# Patient Record
Sex: FEMALE | Race: BLACK OR AFRICAN AMERICAN | NOT HISPANIC OR LATINO | URBAN - METROPOLITAN AREA
[De-identification: names, ages, dates, MRNs, and addresses within clinical notes are randomized per-mention and may not be internally consistent; named-entity substitution may affect disease eponyms.]

---

## 2020-06-05 ENCOUNTER — OFFICE VISIT (OUTPATIENT)
Dept: URBAN - METROPOLITAN AREA CLINIC 113 | Facility: CLINIC | Age: 70
End: 2020-06-05

## 2020-07-25 ENCOUNTER — TELEPHONE ENCOUNTER (OUTPATIENT)
Dept: URBAN - METROPOLITAN AREA CLINIC 13 | Facility: CLINIC | Age: 70
End: 2020-07-25

## 2020-07-25 RX ORDER — AMLODIPINE BESYLATE 5 MG/1
TAKE 1 TABLET DAILY TABLET ORAL
Qty: 90 | Refills: 0 | OUTPATIENT
Start: 2012-10-25 | End: 2018-03-20

## 2020-07-25 RX ORDER — ESOMEPRAZOLE MAGNESIUM 40 MG
TAKE 1 TABLET DAILY CAPSULE,DELAYED RELEASE (ENTERIC COATED) ORAL
Qty: 90 | Refills: 3 | OUTPATIENT
Start: 2013-06-26 | End: 2018-03-20

## 2020-07-25 RX ORDER — LOSARTAN POTASSIUM 100 MG/1
TAKE 1 TABLET DAILY TABLET, FILM COATED ORAL
Refills: 0 | OUTPATIENT
End: 2019-05-10

## 2020-07-25 RX ORDER — POLYETHYLENE GLYCOL 3350, SODIUM CHLORIDE, SODIUM BICARBONATE AND POTASSIUM CHLORIDE WITH LEMON FLAVOR 420; 11.2; 5.72; 1.48 G/4L; G/4L; G/4L; G/4L
TAKE 1/2 GALLON AT 5:00 PM DAY BEFORE PROCEDURE, TAKE SECOND 1/2 OF GALLON 6 HRS PRIOR TO PROCEDURE POWDER, FOR SOLUTION ORAL
Qty: 1 | Refills: 0 | OUTPATIENT
Start: 2018-08-27 | End: 2018-10-09

## 2020-07-25 RX ORDER — HYDROCODONE BITARTRATE AND ACETAMINOPHEN 5; 325 MG/1; MG/1
TABLET ORAL
Refills: 0 | OUTPATIENT
End: 2018-10-09

## 2020-07-25 RX ORDER — PANTOPRAZOLE SODIUM 40 MG/1
TAKE 1 TABLET BY MOUTH EVERY DAY TABLET, DELAYED RELEASE ORAL
Qty: 30 | Refills: 5 | OUTPATIENT
Start: 2019-06-03 | End: 2019-06-25

## 2020-07-25 RX ORDER — CARVEDILOL PHOSPHATE 20 MG/1
TAKE 1 CAPSULE EVERY MORNING WITH FOOD CAPSULE, EXTENDED RELEASE ORAL
Refills: 0 | OUTPATIENT
Start: 2012-10-25 | End: 2018-10-09

## 2020-07-25 RX ORDER — OMEPRAZOLE 20 MG/1
TAKE 1 CAPSULE DAILY CAPSULE, DELAYED RELEASE ORAL
Refills: 0 | OUTPATIENT
End: 2012-12-19

## 2020-07-25 RX ORDER — SUCRALFATE 1 G/1
DISSOLE ONE TABLET IN 2 TO 3 TABLESPOONS OF WATERS AND DRINK EVERY 6 HOURS AS NEEDED TABLET ORAL
Qty: 90 | Refills: 0 | OUTPATIENT
Start: 2013-01-23 | End: 2013-07-30

## 2020-07-25 RX ORDER — OMEPRAZOLE 40 MG/1
TAKE 1 CAPSULE BY MOUTH 30 MINUTES BEFORE BREAKFAST CAPSULE, DELAYED RELEASE ORAL
Qty: 30 | Refills: 3 | OUTPATIENT
Start: 2018-03-20 | End: 2018-10-09

## 2020-07-25 RX ORDER — ASPIRIN 81 MG/1
TAKE TABLET  PRN TABLET, DELAYED RELEASE ORAL
Refills: 0 | OUTPATIENT
End: 2012-11-26

## 2020-07-25 RX ORDER — ESOMEPRAZOLE MAGNESIUM 40 MG
TAKE ONE CAPSULE EVERY DAY CAPSULE,DELAYED RELEASE (ENTERIC COATED) ORAL
Qty: 30 | Refills: 3 | OUTPATIENT
End: 2013-07-30

## 2020-07-25 RX ORDER — TRAMADOL HYDROCHLORIDE 50 MG/1
TAKE 1 TABLET BY MOUTH EVERY 6 HOURS AS NEEDED FOR PAIN TABLET ORAL
Qty: 45 | Refills: 0 | OUTPATIENT
End: 2019-06-03

## 2020-07-26 ENCOUNTER — TELEPHONE ENCOUNTER (OUTPATIENT)
Dept: URBAN - METROPOLITAN AREA CLINIC 13 | Facility: CLINIC | Age: 70
End: 2020-07-26

## 2020-07-26 RX ORDER — DOXYCYCLINE 100 MG/1
TK 1 T PO BID TABLET, FILM COATED ORAL
Qty: 20 | Refills: 0 | Status: ACTIVE | COMMUNITY
Start: 2018-04-25

## 2020-07-26 RX ORDER — LOSARTAN POTASSIUM AND HYDROCHLOROTHIAZIDE 100; 25 MG/1; MG/1
TK 1 T PO QD TABLET, FILM COATED ORAL
Qty: 90 | Refills: 0 | Status: ACTIVE | COMMUNITY
Start: 2017-10-30

## 2020-07-26 RX ORDER — HYDROCODONE BITARTRATE AND ACETAMINOPHEN 7.5; 325 MG/1; MG/1
TABLET ORAL
Qty: 24 | Refills: 0 | Status: ACTIVE | COMMUNITY
Start: 2019-03-13

## 2020-07-26 RX ORDER — CHLORPHENIRAMINE MALEATE, DEXTROMETHORPHAN HYDROBROMIDE AND PHENYLEPHRINE HYDROCHLORIDE 4; 10; 15 MG/5ML; MG/5ML; MG/5ML
TAKE 5 ML EVERY 6 HOURS AS NEEDED ORALLY LIQUID ORAL
Qty: 180 | Refills: 0 | Status: ACTIVE | COMMUNITY
Start: 2019-07-03

## 2020-07-26 RX ORDER — PRAVASTATIN SODIUM 10 MG/1
TAKE 1 TABLET AT BEDTIME TABLET ORAL
Refills: 0 | Status: ACTIVE | COMMUNITY

## 2020-07-26 RX ORDER — TIZANIDINE 4 MG/1
TK 1 T PO  BID PRN TABLET ORAL
Qty: 60 | Refills: 0 | Status: ACTIVE | COMMUNITY
Start: 2018-07-06

## 2020-07-26 RX ORDER — DOXAZOSIN 2 MG/1
TABLET ORAL
Qty: 90 | Refills: 0 | Status: ACTIVE | COMMUNITY
Start: 2019-08-05

## 2020-07-26 RX ORDER — HYDROCODONE BITARTRATE AND ACETAMINOPHEN 7.5; 325 MG/1; MG/1
TK 1 T PO Q 12 H PRN TABLET ORAL
Qty: 15 | Refills: 0 | Status: ACTIVE | COMMUNITY
Start: 2018-07-09

## 2020-07-26 RX ORDER — LEVOFLOXACIN 500 MG/1
TK 1 T PO ONCE DAILY TABLET, FILM COATED ORAL
Qty: 10 | Refills: 0 | Status: ACTIVE | COMMUNITY
Start: 2018-07-06

## 2020-07-26 RX ORDER — BENZONATATE 100 MG/1
TK 1 C PO TID FOR 10 DAYS CAPSULE ORAL
Qty: 30 | Refills: 0 | Status: ACTIVE | COMMUNITY
Start: 2018-02-27

## 2020-07-26 RX ORDER — MELOXICAM 15 MG/1
TABLET ORAL
Qty: 90 | Refills: 0 | Status: ACTIVE | COMMUNITY
Start: 2012-08-20

## 2020-07-26 RX ORDER — DOXYCYCLINE 100 MG/1
TK ONE C PO BID FOR 10 DAYS CAPSULE ORAL
Qty: 20 | Refills: 0 | Status: ACTIVE | COMMUNITY
Start: 2018-07-09

## 2020-07-26 RX ORDER — PANTOPRAZOLE 20 MG/1
TAKE 1 TABLET DAILY TABLET, DELAYED RELEASE ORAL
Qty: 90 | Refills: 3 | Status: ACTIVE | COMMUNITY
Start: 2019-06-25

## 2020-07-26 RX ORDER — HYDROCODONE BITARTRATE AND ACETAMINOPHEN 7.5; 325 MG/1; MG/1
TABLET ORAL
Qty: 30 | Refills: 0 | Status: ACTIVE | COMMUNITY
Start: 2019-02-13

## 2020-07-26 RX ORDER — HYDROCHLOROTHIAZIDE 25 MG/1
TABLET ORAL
Qty: 90 | Refills: 0 | Status: ACTIVE | COMMUNITY
Start: 2019-08-04

## 2020-07-26 RX ORDER — CLINDAMYCIN HYDROCHLORIDE 300 MG/1
CAPSULE ORAL
Qty: 30 | Refills: 0 | Status: ACTIVE | COMMUNITY
Start: 2019-03-13

## 2020-07-26 RX ORDER — LOSARTAN POTASSIUM AND HYDROCHLOROTHIAZIDE 100; 25 MG/1; MG/1
TAKE 1 TABLET BY MOUTH EVERY DAY TABLET, FILM COATED ORAL
Qty: 90 | Refills: 0 | Status: ACTIVE | COMMUNITY
Start: 2018-07-08

## 2020-07-26 RX ORDER — SITAGLIPTIN AND METFORMIN HYDROCHLORIDE 100; 1000 MG/1; MG/1
TABLET, FILM COATED, EXTENDED RELEASE ORAL
Qty: 90 | Refills: 0 | Status: ACTIVE | COMMUNITY
Start: 2019-02-20

## 2020-07-26 RX ORDER — DOXAZOSIN MESYLATE 4 MG/1
TABLET ORAL
Qty: 90 | Refills: 0 | Status: ACTIVE | COMMUNITY
Start: 2020-03-12

## 2020-07-26 RX ORDER — TIZANIDINE 4 MG/1
TABLET ORAL
Qty: 60 | Refills: 0 | Status: ACTIVE | COMMUNITY
Start: 2019-02-20

## 2020-07-26 RX ORDER — PRAVASTATIN SODIUM 40 MG/1
TABLET ORAL
Qty: 90 | Refills: 0 | Status: ACTIVE | COMMUNITY
Start: 2019-02-20

## 2020-07-26 RX ORDER — BLOOD SUGAR DIAGNOSTIC
STRIP MISCELLANEOUS
Qty: 200 | Refills: 0 | Status: ACTIVE | COMMUNITY
Start: 2019-10-24

## 2020-07-26 RX ORDER — NEOMYCIN SULFATE, POLYMYXIN B SULFATE AND HYDROCORTISONE 3.5; 10000; 1 MG/ML; [IU]/ML; MG/ML
SOLUTION AURICULAR (OTIC)
Qty: 10 | Refills: 0 | Status: ACTIVE | COMMUNITY
Start: 2019-02-13

## 2020-07-26 RX ORDER — FLUCONAZOLE 150 MG/1
TABLET ORAL
Qty: 1 | Refills: 0 | Status: ACTIVE | COMMUNITY
Start: 2019-08-15

## 2020-07-26 RX ORDER — PIOGLITAZONE 30 MG/1
TABLET ORAL
Qty: 90 | Refills: 0 | Status: ACTIVE | COMMUNITY
Start: 2020-04-24

## 2020-07-26 RX ORDER — OXYCODONE AND ACETAMINOPHEN 10; 325 MG/1; MG/1
TK 1 T PO Q 8 H PRN TABLET ORAL
Qty: 21 | Refills: 0 | Status: ACTIVE | COMMUNITY
Start: 2018-12-31

## 2020-07-26 RX ORDER — LOSARTAN POTASSIUM AND HYDROCHLOROTHIAZIDE 100; 25 MG/1; MG/1
TAKE 1 TABLET DAILY TABLET, FILM COATED ORAL
Refills: 0 | Status: ACTIVE | COMMUNITY

## 2020-07-26 RX ORDER — OXYCODONE AND ACETAMINOPHEN 5; 325 MG/1; MG/1
TK 1 T PO  Q 4 H PRN P TABLET ORAL
Qty: 40 | Refills: 0 | Status: ACTIVE | COMMUNITY
Start: 2018-12-17

## 2020-07-26 RX ORDER — OFLOXACIN 3 MG/ML
SOLUTION AURICULAR (OTIC)
Qty: 10 | Refills: 0 | Status: ACTIVE | COMMUNITY
Start: 2020-04-13

## 2020-07-26 RX ORDER — TIZANIDINE HYDROCHLORIDE 4 MG/1
CAPSULE ORAL
Qty: 60 | Refills: 0 | Status: ACTIVE | COMMUNITY
Start: 2019-04-27

## 2020-07-26 RX ORDER — GLIMEPIRIDE 4 MG/1
TABLET ORAL
Qty: 180 | Refills: 0 | Status: ACTIVE | COMMUNITY
Start: 2019-02-23

## 2020-07-26 RX ORDER — HYDROCHLOROTHIAZIDE 25 MG/1
TABLET ORAL
Qty: 90 | Refills: 0 | Status: ACTIVE | COMMUNITY
Start: 2019-05-14

## 2020-07-26 RX ORDER — GLIMEPIRIDE 1 MG/1
TAKE 1 TABLET DAILY TABLET ORAL
Refills: 0 | Status: ACTIVE | COMMUNITY

## 2020-07-26 RX ORDER — SITAGLIPTIN PHOSPHATE 100 MG
TABLET ORAL
Qty: 90 | Refills: 0 | Status: ACTIVE | COMMUNITY
Start: 2020-04-03

## 2020-07-26 RX ORDER — PRAVASTATIN SODIUM 40 MG/1
TABLET ORAL
Qty: 90 | Refills: 0 | Status: ACTIVE | COMMUNITY
Start: 2019-08-15

## 2020-07-26 RX ORDER — PREGABALIN 75 MG/1
CAPSULE ORAL
Qty: 180 | Refills: 0 | Status: ACTIVE | COMMUNITY
Start: 2019-10-09

## 2020-07-26 RX ORDER — PRAVASTATIN SODIUM 40 MG/1
TABLET ORAL
Qty: 90 | Refills: 0 | Status: ACTIVE | COMMUNITY
Start: 2019-05-14

## 2020-07-26 RX ORDER — BLOOD-GLUCOSE METER
EACH MISCELLANEOUS
Qty: 1 | Refills: 0 | Status: ACTIVE | COMMUNITY
Start: 2019-10-25

## 2020-07-26 RX ORDER — DOXAZOSIN 2 MG/1
TABLET ORAL
Qty: 90 | Refills: 0 | Status: ACTIVE | COMMUNITY
Start: 2019-05-09

## 2020-07-26 RX ORDER — SITAGLIPTIN AND METFORMIN HYDROCHLORIDE 100; 1000 MG/1; MG/1
TABLET, FILM COATED, EXTENDED RELEASE ORAL
Qty: 90 | Refills: 0 | Status: ACTIVE | COMMUNITY
Start: 2019-08-04

## 2020-07-26 RX ORDER — LOSARTAN POTASSIUM AND HYDROCHLOROTHIAZIDE 100; 25 MG/1; MG/1
TABLET, FILM COATED ORAL
Qty: 90 | Refills: 0 | Status: ACTIVE | COMMUNITY
Start: 2019-02-20

## 2020-07-26 RX ORDER — PRAVASTATIN SODIUM 40 MG/1
TAKE 1 TABLET BY MOUTH AT BEDTIME TABLET ORAL
Qty: 90 | Refills: 0 | Status: ACTIVE | COMMUNITY
Start: 2018-07-06

## 2020-07-26 RX ORDER — DOXYCYCLINE 100 MG/1
CAPSULE ORAL
Qty: 20 | Refills: 0 | Status: ACTIVE | COMMUNITY
Start: 2019-10-17

## 2020-07-26 RX ORDER — CARVEDILOL PHOSPHATE 40 MG/1
TAKE 1 CAPSULE EVERY MORNING WITH FOOD CAPSULE, EXTENDED RELEASE ORAL
Refills: 0 | Status: ACTIVE | COMMUNITY

## 2020-07-26 RX ORDER — AZITHROMYCIN DIHYDRATE 250 MG/1
TABLET, FILM COATED ORAL
Qty: 6 | Refills: 0 | Status: ACTIVE | COMMUNITY
Start: 2013-05-16

## 2020-07-26 RX ORDER — SITAGLIPTIN AND METFORMIN HYDROCHLORIDE 100; 1000 MG/1; MG/1
TK 1 T PO  QPM TABLET, FILM COATED, EXTENDED RELEASE ORAL
Qty: 90 | Refills: 0 | Status: ACTIVE | COMMUNITY
Start: 2018-07-08

## 2020-07-26 RX ORDER — ERYTHROMYCIN 500 MG/1
TABLET, COATED ORAL
Qty: 20 | Refills: 0 | Status: ACTIVE | COMMUNITY
Start: 2019-03-21

## 2020-07-26 RX ORDER — PIOGLITAZONE 15 MG/1
TABLET ORAL
Qty: 90 | Refills: 0 | Status: ACTIVE | COMMUNITY
Start: 2020-04-03

## 2020-07-26 RX ORDER — FOSINOPRIL SODIUM 20 MG/1
TABLET ORAL
Qty: 90 | Refills: 0 | Status: ACTIVE | COMMUNITY
Start: 2012-07-31

## 2020-07-26 RX ORDER — GLIMEPIRIDE 4 MG/1
TK 1 T PO BID TABLET ORAL
Qty: 180 | Refills: 0 | Status: ACTIVE | COMMUNITY
Start: 2018-02-27

## 2020-07-26 RX ORDER — GLIMEPIRIDE 4 MG/1
TABLET ORAL
Qty: 180 | Refills: 0 | Status: ACTIVE | COMMUNITY
Start: 2019-08-04

## 2020-07-26 RX ORDER — SITAGLIPTIN AND METFORMIN HYDROCHLORIDE 50; 1000 MG/1; MG/1
TAKE 1 TABLET TWICE DAILY WITH MEALS TABLET, FILM COATED ORAL
Qty: 180 | Refills: 0 | Status: ACTIVE | COMMUNITY
Start: 2012-11-02

## 2020-07-26 RX ORDER — METRONIDAZOLE 500 MG/1
TK 2 TS PO AT 4 PM ON THE DAY PRIOR TO SURGERY TABLET ORAL
Qty: 2 | Refills: 0 | Status: ACTIVE | COMMUNITY
Start: 2018-12-13

## 2020-07-26 RX ORDER — NEOMYCIN SULFATE 500 MG
TK 2 TS PO AT 4 PM ON THE DAY PRIOR TO SURGERY TABLET ORAL
Qty: 2 | Refills: 0 | Status: ACTIVE | COMMUNITY
Start: 2018-12-13

## 2020-07-26 RX ORDER — NYSTATIN AND TRIAMCINOLONE ACETONIDE 100000; 1 MG/G; MG/G
CREAM TOPICAL
Qty: 30 | Refills: 0 | Status: ACTIVE | COMMUNITY
Start: 2019-08-15

## 2020-07-26 RX ORDER — TIZANIDINE HYDROCHLORIDE 4 MG/1
TK 1 C PO BID PRN P CAPSULE ORAL
Qty: 30 | Refills: 0 | Status: ACTIVE | COMMUNITY
Start: 2017-11-29

## 2020-07-26 RX ORDER — SIMVASTATIN 20 MG/1
TABLET, FILM COATED ORAL
Qty: 90 | Refills: 0 | Status: ACTIVE | COMMUNITY
Start: 2012-10-26

## 2020-11-17 ENCOUNTER — OFFICE VISIT (OUTPATIENT)
Dept: URBAN - METROPOLITAN AREA CLINIC 113 | Facility: CLINIC | Age: 70
End: 2020-11-17

## 2021-06-21 ENCOUNTER — OFFICE VISIT (OUTPATIENT)
Dept: URBAN - METROPOLITAN AREA CLINIC 113 | Facility: CLINIC | Age: 71
End: 2021-06-21
Payer: MEDICARE

## 2021-06-21 VITALS
DIASTOLIC BLOOD PRESSURE: 79 MMHG | HEIGHT: 64 IN | BODY MASS INDEX: 36.28 KG/M2 | SYSTOLIC BLOOD PRESSURE: 154 MMHG | RESPIRATION RATE: 20 BRPM | HEART RATE: 74 BPM | TEMPERATURE: 97.7 F | WEIGHT: 212.5 LBS

## 2021-06-21 DIAGNOSIS — D12.6 TUBULOVILLOUS ADENOMA OF COLON: ICD-10-CM

## 2021-06-21 DIAGNOSIS — Z86.010 HISTORY OF ADENOMATOUS POLYP OF COLON: ICD-10-CM

## 2021-06-21 DIAGNOSIS — K59.09 CHRONIC CONSTIPATION: ICD-10-CM

## 2021-06-21 DIAGNOSIS — Z12.11 ENCOUNTER FOR SCREENING COLONOSCOPY: ICD-10-CM

## 2021-06-21 PROCEDURE — 99213 OFFICE O/P EST LOW 20 MIN: CPT | Performed by: INTERNAL MEDICINE

## 2021-06-21 RX ORDER — SITAGLIPTIN AND METFORMIN HYDROCHLORIDE 100; 1000 MG/1; MG/1
TK 1 T PO  QPM TABLET, FILM COATED, EXTENDED RELEASE ORAL
Qty: 90 | Refills: 0 | COMMUNITY
Start: 2018-07-08

## 2021-06-21 RX ORDER — CHLORPHENIRAMINE MALEATE, DEXTROMETHORPHAN HYDROBROMIDE AND PHENYLEPHRINE HYDROCHLORIDE 4; 10; 15 MG/5ML; MG/5ML; MG/5ML
TAKE 5 ML EVERY 6 HOURS AS NEEDED ORALLY LIQUID ORAL
Qty: 180 | Refills: 0 | COMMUNITY
Start: 2019-07-03

## 2021-06-21 RX ORDER — CLINDAMYCIN HYDROCHLORIDE 300 MG/1
CAPSULE ORAL
Qty: 30 | Refills: 0 | Status: ON HOLD | COMMUNITY
Start: 2019-03-13

## 2021-06-21 RX ORDER — DOXYCYCLINE 100 MG/1
TK ONE C PO BID FOR 10 DAYS CAPSULE ORAL
Qty: 20 | Refills: 0 | COMMUNITY
Start: 2018-07-09

## 2021-06-21 RX ORDER — NYSTATIN AND TRIAMCINOLONE ACETONIDE 100000; 1 MG/G; MG/G
CREAM TOPICAL
Qty: 30 | Refills: 0 | Status: ACTIVE | COMMUNITY
Start: 2019-08-15

## 2021-06-21 RX ORDER — PIOGLITAZONE 15 MG/1
TABLET ORAL
Qty: 90 | Refills: 0 | COMMUNITY
Start: 2020-04-03

## 2021-06-21 RX ORDER — ERYTHROMYCIN 500 MG/1
TABLET, COATED ORAL
Qty: 20 | Refills: 0 | COMMUNITY
Start: 2019-03-21

## 2021-06-21 RX ORDER — FOSINOPRIL SODIUM 20 MG/1
TABLET ORAL
Qty: 90 | Refills: 0 | COMMUNITY
Start: 2012-07-31

## 2021-06-21 RX ORDER — HYDROCODONE BITARTRATE AND ACETAMINOPHEN 7.5; 325 MG/1; MG/1
TK 1 T PO Q 12 H PRN TABLET ORAL
Qty: 15 | Refills: 0 | COMMUNITY
Start: 2018-07-09

## 2021-06-21 RX ORDER — OFLOXACIN 3 MG/ML
SOLUTION AURICULAR (OTIC)
Qty: 10 | Refills: 0 | COMMUNITY
Start: 2020-04-13

## 2021-06-21 RX ORDER — DOXYCYCLINE 100 MG/1
TK 1 T PO BID TABLET, FILM COATED ORAL
Qty: 20 | Refills: 0 | COMMUNITY
Start: 2018-04-25

## 2021-06-21 RX ORDER — GLIMEPIRIDE 1 MG/1
TAKE 1 TABLET DAILY TABLET ORAL
Refills: 0 | Status: ACTIVE | COMMUNITY

## 2021-06-21 RX ORDER — MELOXICAM 15 MG/1
TABLET ORAL
Qty: 90 | Refills: 0 | COMMUNITY
Start: 2012-08-20

## 2021-06-21 RX ORDER — BLOOD-GLUCOSE METER
EACH MISCELLANEOUS
Qty: 1 | Refills: 0 | COMMUNITY
Start: 2019-10-25

## 2021-06-21 RX ORDER — SIMVASTATIN 20 MG/1
TABLET, FILM COATED ORAL
Qty: 90 | Refills: 0 | COMMUNITY
Start: 2012-10-26

## 2021-06-21 RX ORDER — NEOMYCIN SULFATE, POLYMYXIN B SULFATE AND HYDROCORTISONE 3.5; 10000; 1 MG/ML; [IU]/ML; MG/ML
SOLUTION AURICULAR (OTIC)
Qty: 10 | Refills: 0 | COMMUNITY
Start: 2019-02-13

## 2021-06-21 RX ORDER — LOSARTAN POTASSIUM AND HYDROCHLOROTHIAZIDE 100; 25 MG/1; MG/1
TAKE 1 TABLET DAILY TABLET, FILM COATED ORAL
Refills: 0 | Status: ACTIVE | COMMUNITY

## 2021-06-21 RX ORDER — PIOGLITAZONE 30 MG/1
TABLET ORAL
Qty: 90 | Refills: 0 | COMMUNITY
Start: 2020-04-24

## 2021-06-21 RX ORDER — OXYCODONE AND ACETAMINOPHEN 5; 325 MG/1; MG/1
TK 1 T PO  Q 4 H PRN P TABLET ORAL
Qty: 40 | Refills: 0 | COMMUNITY
Start: 2018-12-17

## 2021-06-21 RX ORDER — TIZANIDINE 4 MG/1
TK 1 T PO  BID PRN TABLET ORAL
Qty: 60 | Refills: 0 | COMMUNITY
Start: 2018-07-06

## 2021-06-21 RX ORDER — BENZONATATE 100 MG/1
TK 1 C PO TID FOR 10 DAYS CAPSULE ORAL
Qty: 30 | Refills: 0 | Status: ON HOLD | COMMUNITY
Start: 2018-02-27

## 2021-06-21 RX ORDER — NEOMYCIN SULFATE 500 MG
TK 2 TS PO AT 4 PM ON THE DAY PRIOR TO SURGERY TABLET ORAL
Qty: 2 | Refills: 0 | COMMUNITY
Start: 2018-12-13

## 2021-06-21 RX ORDER — PREGABALIN 75 MG/1
CAPSULE ORAL
Qty: 180 | Refills: 0 | COMMUNITY
Start: 2019-10-09

## 2021-06-21 RX ORDER — HYDROCHLOROTHIAZIDE 25 MG/1
1 TABLET TABLET ORAL ONCE A DAY
Refills: 0 | COMMUNITY
Start: 2019-05-14

## 2021-06-21 RX ORDER — SITAGLIPTIN AND METFORMIN HYDROCHLORIDE 50; 1000 MG/1; MG/1
TAKE 1 TABLET TWICE DAILY WITH MEALS TABLET, FILM COATED ORAL
Qty: 180 | Refills: 0 | COMMUNITY
Start: 2012-11-02

## 2021-06-21 RX ORDER — SITAGLIPTIN PHOSPHATE 100 MG
1 TABLET TABLET ORAL ONCE A DAY
Refills: 0 | Status: ACTIVE | COMMUNITY
Start: 2020-04-03

## 2021-06-21 RX ORDER — OXYCODONE AND ACETAMINOPHEN 10; 325 MG/1; MG/1
TK 1 T PO Q 8 H PRN TABLET ORAL
Qty: 21 | Refills: 0 | COMMUNITY
Start: 2018-12-31

## 2021-06-21 RX ORDER — AZITHROMYCIN DIHYDRATE 250 MG/1
TABLET, FILM COATED ORAL
Qty: 6 | Refills: 0 | Status: ON HOLD | COMMUNITY
Start: 2013-05-16

## 2021-06-21 RX ORDER — GLIMEPIRIDE 4 MG/1
TK 1 T PO BID TABLET ORAL
Qty: 180 | Refills: 0 | COMMUNITY
Start: 2018-02-27

## 2021-06-21 RX ORDER — BLOOD SUGAR DIAGNOSTIC
STRIP MISCELLANEOUS
Qty: 200 | Refills: 0 | COMMUNITY
Start: 2019-10-24

## 2021-06-21 RX ORDER — TIZANIDINE HYDROCHLORIDE 4 MG/1
TK 1 C PO BID PRN P CAPSULE ORAL
Qty: 30 | Refills: 0 | COMMUNITY
Start: 2017-11-29

## 2021-06-21 RX ORDER — PANTOPRAZOLE 20 MG/1
TAKE 1 TABLET DAILY TABLET, DELAYED RELEASE ORAL
Qty: 90 | Refills: 3 | Status: ACTIVE | COMMUNITY
Start: 2019-06-25

## 2021-06-21 RX ORDER — METRONIDAZOLE 500 MG/1
TK 2 TS PO AT 4 PM ON THE DAY PRIOR TO SURGERY TABLET ORAL
Qty: 2 | Refills: 0 | COMMUNITY
Start: 2018-12-13

## 2021-06-21 RX ORDER — FLUCONAZOLE 150 MG/1
TABLET ORAL
Qty: 1 | Refills: 0 | COMMUNITY
Start: 2019-08-15

## 2021-06-21 RX ORDER — DOXAZOSIN MESYLATE 4 MG/1
1 TABLET TABLET ORAL ONCE A DAY
Refills: 0 | Status: ACTIVE | COMMUNITY
Start: 2020-03-12

## 2021-06-21 RX ORDER — LEVOFLOXACIN 500 MG/1
TK 1 T PO ONCE DAILY TABLET, FILM COATED ORAL
Qty: 10 | Refills: 0 | COMMUNITY
Start: 2018-07-06

## 2021-06-21 RX ORDER — POLYETHYLENE GLYCOL 3350, SODIUM SULFATE ANHYDROUS, SODIUM BICARBONATE, SODIUM CHLORIDE, POTASSIUM CHLORIDE 236; 22.74; 6.74; 5.86; 2.97 G/4L; G/4L; G/4L; G/4L; G/4L
236 G POWDER, FOR SOLUTION ORAL ONCE
Qty: 236 G | Refills: 0 | OUTPATIENT
Start: 2021-06-21 | End: 2021-06-22

## 2021-06-21 RX ORDER — PRAVASTATIN SODIUM 40 MG/1
TAKE 1 TABLET BY MOUTH AT BEDTIME TABLET ORAL
Qty: 90 | Refills: 0 | Status: ACTIVE | COMMUNITY
Start: 2018-07-06

## 2021-06-21 RX ORDER — DOXAZOSIN 2 MG/1
TABLET ORAL
Qty: 90 | Refills: 0 | COMMUNITY
Start: 2019-05-09

## 2021-06-21 RX ORDER — CARVEDILOL PHOSPHATE 40 MG/1
TAKE 1 CAPSULE EVERY MORNING WITH FOOD CAPSULE, EXTENDED RELEASE ORAL
Refills: 0 | Status: ACTIVE | COMMUNITY

## 2021-06-21 RX ORDER — PRAVASTATIN SODIUM 10 MG/1
TAKE 1 TABLET AT BEDTIME TABLET ORAL
Refills: 0 | COMMUNITY

## 2021-06-21 NOTE — HPI-TODAY'S VISIT:
Ms. Power is a 70-year-old woman with a history of coronary artery disease, prior MI status post PTCA with stent in 2007, diabetes mellitus type 2, laparoscopic hand-assisted right hemicolectomy by Dr. Ortega on 2/17/2018 secondary to a cecal/appendiceal orifice villous adenoma not amenable to endoscopic resection (surgical pathology demonstrated tubulovillous adenoma with focal high-grade dysplasia) and a maternal history of colon cancer presenting for follow-up regarding constipation. She was last seen in this office in June 2020 for follow-up regarding constipation and GERD.  She reported straining to pass hard her stools, however, was having fairly regular and normal consistency bowel movements.  She denies any unintentional weight loss.  GERD was doing well on pantoprazole 40 mg once daily.  She was instructed to reduce pantoprazole to 20 mg.  Regarding her constipation, she was instructed to begin soluble fiber supplement.  She is given her history of advanced adenoma and maternal history of colon cancer she is due for screening colonoscopy in Oct 2021.  She reports a 2 to 3-month history of intermittent constipation.  She states that she is experienced a severe bout of constipation at the beginning of March 2021.  At that time she reported having 1 bowel movement per week.  This was around the time her sister passed away from stage IV colon cancer, which prompted her to call his office for further evaluation regarding her constipation.  She is now having 1-2 bowel movements daily without red blood per rectum, melena or hematochezia.  She reports associated lower abdominal cramping that is relieved with defecation.  She states that she has to strain to have a bowel movement.  She denies diarrhea.  She is not taking any over-the-counter laxatives nor is she on a fiber supplement.  She denies any fevers, chills, night sweats, or unintentional weight loss.  She states that her reflux symptoms are doing well on pantoprazole 20 mg once daily.  She denies regurgitation, nocturnal reflux symptoms or difficulty swallowing. Along with her sister who was diagnosed with colon cancer at the age of 82, her mother was also diagnosed with colon cancer at the age of 80 which required a partial colectomy. Interval history is notable for her new diagnosis of stage III kidney disease.  She is followed by Dr. Hernandez for nephrology.  She also states that she was diagnosed with iron deficiency anemia, and has started taking a multivitamin.  This is managed by Dr. Vinson.  Interval history is negative for recent surgeries or hospitalizations.  She is due for screening colonoscopy this year.

## 2021-06-22 PROBLEM — 428054006: Status: ACTIVE | Noted: 2021-06-21

## 2021-08-05 ENCOUNTER — OFFICE VISIT (OUTPATIENT)
Dept: URBAN - METROPOLITAN AREA SURGERY CENTER 25 | Facility: SURGERY CENTER | Age: 71
End: 2021-08-05

## 2021-09-20 ENCOUNTER — WEB ENCOUNTER (OUTPATIENT)
Dept: URBAN - METROPOLITAN AREA CLINIC 107 | Facility: CLINIC | Age: 71
End: 2021-09-20

## 2021-09-20 ENCOUNTER — OFFICE VISIT (OUTPATIENT)
Dept: URBAN - METROPOLITAN AREA CLINIC 107 | Facility: CLINIC | Age: 71
End: 2021-09-20
Payer: MEDICARE

## 2021-09-20 ENCOUNTER — LAB OUTSIDE AN ENCOUNTER (OUTPATIENT)
Dept: URBAN - METROPOLITAN AREA CLINIC 107 | Facility: CLINIC | Age: 71
End: 2021-09-20

## 2021-09-20 VITALS
HEIGHT: 64 IN | DIASTOLIC BLOOD PRESSURE: 89 MMHG | RESPIRATION RATE: 20 BRPM | TEMPERATURE: 98.4 F | SYSTOLIC BLOOD PRESSURE: 191 MMHG | WEIGHT: 217 LBS | HEART RATE: 77 BPM | BODY MASS INDEX: 37.05 KG/M2

## 2021-09-20 DIAGNOSIS — Z86.010 HISTORY OF ADENOMATOUS POLYP OF COLON: ICD-10-CM

## 2021-09-20 DIAGNOSIS — Z80.0 FAMILY HISTORY OF COLON CANCER: ICD-10-CM

## 2021-09-20 DIAGNOSIS — Z80.0 FAMILY HISTORY OF GASTRIC CANCER: ICD-10-CM

## 2021-09-20 DIAGNOSIS — K59.09 CHRONIC CONSTIPATION: ICD-10-CM

## 2021-09-20 DIAGNOSIS — K21.9 GASTROESOPHAGEAL REFLUX DISEASE, UNSPECIFIED WHETHER ESOPHAGITIS PRESENT: ICD-10-CM

## 2021-09-20 PROBLEM — 312824007: Status: ACTIVE | Noted: 2021-09-20

## 2021-09-20 PROCEDURE — 99214 OFFICE O/P EST MOD 30 MIN: CPT | Performed by: INTERNAL MEDICINE

## 2021-09-20 RX ORDER — LOSARTAN POTASSIUM AND HYDROCHLOROTHIAZIDE 100; 25 MG/1; MG/1
TAKE 1 TABLET DAILY TABLET, FILM COATED ORAL
Refills: 0 | Status: ACTIVE | COMMUNITY

## 2021-09-20 RX ORDER — NEOMYCIN SULFATE, POLYMYXIN B SULFATE AND HYDROCORTISONE 3.5; 10000; 1 MG/ML; [IU]/ML; MG/ML
SOLUTION AURICULAR (OTIC)
Qty: 10 | Refills: 0 | COMMUNITY
Start: 2019-02-13

## 2021-09-20 RX ORDER — CARVEDILOL PHOSPHATE 40 MG/1
TAKE 1 CAPSULE EVERY MORNING WITH FOOD CAPSULE, EXTENDED RELEASE ORAL
Refills: 0 | Status: ACTIVE | COMMUNITY

## 2021-09-20 RX ORDER — TIZANIDINE 4 MG/1
TK 1 T PO  BID PRN TABLET ORAL
Qty: 60 | Refills: 0 | COMMUNITY
Start: 2018-07-06

## 2021-09-20 RX ORDER — GLIMEPIRIDE 4 MG/1
TK 1 T PO BID TABLET ORAL
Qty: 180 | Refills: 0 | COMMUNITY
Start: 2018-02-27

## 2021-09-20 RX ORDER — PRAVASTATIN SODIUM 40 MG/1
TAKE 1 TABLET BY MOUTH AT BEDTIME TABLET ORAL
Qty: 90 | Refills: 0 | Status: ACTIVE | COMMUNITY
Start: 2018-07-06

## 2021-09-20 RX ORDER — ERYTHROMYCIN 500 MG/1
TABLET, COATED ORAL
Qty: 20 | Refills: 0 | COMMUNITY
Start: 2019-03-21

## 2021-09-20 RX ORDER — OFLOXACIN 3 MG/ML
SOLUTION AURICULAR (OTIC)
Qty: 10 | Refills: 0 | COMMUNITY
Start: 2020-04-13

## 2021-09-20 RX ORDER — NEOMYCIN SULFATE 500 MG
TK 2 TS PO AT 4 PM ON THE DAY PRIOR TO SURGERY TABLET ORAL
Qty: 2 | Refills: 0 | COMMUNITY
Start: 2018-12-13

## 2021-09-20 RX ORDER — DOXYCYCLINE 100 MG/1
TK ONE C PO BID FOR 10 DAYS CAPSULE ORAL
Qty: 20 | Refills: 0 | COMMUNITY
Start: 2018-07-09

## 2021-09-20 RX ORDER — BLOOD-GLUCOSE METER
EACH MISCELLANEOUS
Qty: 1 | Refills: 0 | COMMUNITY
Start: 2019-10-25

## 2021-09-20 RX ORDER — PANTOPRAZOLE 20 MG/1
TAKE 1 TABLET DAILY TABLET, DELAYED RELEASE ORAL
Qty: 90 | Refills: 3 | Status: ACTIVE | COMMUNITY
Start: 2019-06-25

## 2021-09-20 RX ORDER — CLINDAMYCIN HYDROCHLORIDE 300 MG/1
CAPSULE ORAL
Qty: 30 | Refills: 0 | Status: ON HOLD | COMMUNITY
Start: 2019-03-13

## 2021-09-20 RX ORDER — TIZANIDINE HYDROCHLORIDE 4 MG/1
TK 1 C PO BID PRN P CAPSULE ORAL
Qty: 30 | Refills: 0 | COMMUNITY
Start: 2017-11-29

## 2021-09-20 RX ORDER — BLOOD SUGAR DIAGNOSTIC
STRIP MISCELLANEOUS
Qty: 200 | Refills: 0 | COMMUNITY
Start: 2019-10-24

## 2021-09-20 RX ORDER — DOXYCYCLINE 100 MG/1
TK 1 T PO BID TABLET, FILM COATED ORAL
Qty: 20 | Refills: 0 | COMMUNITY
Start: 2018-04-25

## 2021-09-20 RX ORDER — SITAGLIPTIN PHOSPHATE 100 MG
1 TABLET TABLET ORAL ONCE A DAY
Refills: 0 | Status: ACTIVE | COMMUNITY
Start: 2020-04-03

## 2021-09-20 RX ORDER — SIMVASTATIN 20 MG/1
TABLET, FILM COATED ORAL
Qty: 90 | Refills: 0 | COMMUNITY
Start: 2012-10-26

## 2021-09-20 RX ORDER — LEVOFLOXACIN 500 MG/1
TK 1 T PO ONCE DAILY TABLET, FILM COATED ORAL
Qty: 10 | Refills: 0 | COMMUNITY
Start: 2018-07-06

## 2021-09-20 RX ORDER — METRONIDAZOLE 500 MG/1
TK 2 TS PO AT 4 PM ON THE DAY PRIOR TO SURGERY TABLET ORAL
Qty: 2 | Refills: 0 | COMMUNITY
Start: 2018-12-13

## 2021-09-20 RX ORDER — OXYCODONE AND ACETAMINOPHEN 10; 325 MG/1; MG/1
TK 1 T PO Q 8 H PRN TABLET ORAL
Qty: 21 | Refills: 0 | COMMUNITY
Start: 2018-12-31

## 2021-09-20 RX ORDER — DOXAZOSIN MESYLATE 4 MG/1
1 TABLET TABLET ORAL ONCE A DAY
Refills: 0 | Status: ACTIVE | COMMUNITY
Start: 2020-03-12

## 2021-09-20 RX ORDER — OXYCODONE AND ACETAMINOPHEN 5; 325 MG/1; MG/1
TK 1 T PO  Q 4 H PRN P TABLET ORAL
Qty: 40 | Refills: 0 | COMMUNITY
Start: 2018-12-17

## 2021-09-20 RX ORDER — CHLORPHENIRAMINE MALEATE, DEXTROMETHORPHAN HYDROBROMIDE AND PHENYLEPHRINE HYDROCHLORIDE 4; 10; 15 MG/5ML; MG/5ML; MG/5ML
TAKE 5 ML EVERY 6 HOURS AS NEEDED ORALLY LIQUID ORAL
Qty: 180 | Refills: 0 | COMMUNITY
Start: 2019-07-03

## 2021-09-20 RX ORDER — DOXAZOSIN 2 MG/1
TABLET ORAL
Qty: 90 | Refills: 0 | COMMUNITY
Start: 2019-05-09

## 2021-09-20 RX ORDER — PRAVASTATIN SODIUM 10 MG/1
TAKE 1 TABLET AT BEDTIME TABLET ORAL
Refills: 0 | COMMUNITY

## 2021-09-20 RX ORDER — BENZONATATE 100 MG/1
TK 1 C PO TID FOR 10 DAYS CAPSULE ORAL
Qty: 30 | Refills: 0 | Status: ON HOLD | COMMUNITY
Start: 2018-02-27

## 2021-09-20 RX ORDER — GLIMEPIRIDE 1 MG/1
TAKE 1 TABLET DAILY TABLET ORAL
Refills: 0 | Status: ACTIVE | COMMUNITY

## 2021-09-20 RX ORDER — NYSTATIN AND TRIAMCINOLONE ACETONIDE 100000; 1 MG/G; MG/G
CREAM TOPICAL
Qty: 30 | Refills: 0 | Status: ACTIVE | COMMUNITY
Start: 2019-08-15

## 2021-09-20 RX ORDER — HYDROCODONE BITARTRATE AND ACETAMINOPHEN 7.5; 325 MG/1; MG/1
TK 1 T PO Q 12 H PRN TABLET ORAL
Qty: 15 | Refills: 0 | COMMUNITY
Start: 2018-07-09

## 2021-09-20 RX ORDER — MELOXICAM 15 MG/1
TABLET ORAL
Qty: 90 | Refills: 0 | COMMUNITY
Start: 2012-08-20

## 2021-09-20 RX ORDER — SITAGLIPTIN AND METFORMIN HYDROCHLORIDE 50; 1000 MG/1; MG/1
TAKE 1 TABLET TWICE DAILY WITH MEALS TABLET, FILM COATED ORAL
Qty: 180 | Refills: 0 | COMMUNITY
Start: 2012-11-02

## 2021-09-20 RX ORDER — PIOGLITAZONE 30 MG/1
TABLET ORAL
Qty: 90 | Refills: 0 | COMMUNITY
Start: 2020-04-24

## 2021-09-20 RX ORDER — PREGABALIN 75 MG/1
CAPSULE ORAL
Qty: 180 | Refills: 0 | COMMUNITY
Start: 2019-10-09

## 2021-09-20 RX ORDER — PIOGLITAZONE 15 MG/1
TABLET ORAL
Qty: 90 | Refills: 0 | COMMUNITY
Start: 2020-04-03

## 2021-09-20 RX ORDER — FLUCONAZOLE 150 MG/1
TABLET ORAL
Qty: 1 | Refills: 0 | COMMUNITY
Start: 2019-08-15

## 2021-09-20 RX ORDER — FOSINOPRIL SODIUM 20 MG/1
TABLET ORAL
Qty: 90 | Refills: 0 | COMMUNITY
Start: 2012-07-31

## 2021-09-20 RX ORDER — HYDROCHLOROTHIAZIDE 25 MG/1
1 TABLET TABLET ORAL ONCE A DAY
Refills: 0 | COMMUNITY
Start: 2019-05-14

## 2021-09-20 RX ORDER — AZITHROMYCIN DIHYDRATE 250 MG/1
TABLET, FILM COATED ORAL
Qty: 6 | Refills: 0 | Status: ON HOLD | COMMUNITY
Start: 2013-05-16

## 2021-09-20 RX ORDER — SITAGLIPTIN AND METFORMIN HYDROCHLORIDE 100; 1000 MG/1; MG/1
TK 1 T PO  QPM TABLET, FILM COATED, EXTENDED RELEASE ORAL
Qty: 90 | Refills: 0 | COMMUNITY
Start: 2018-07-08

## 2021-09-20 NOTE — HPI-TODAY'S VISIT:
Ms. Power is a 70-year-old woman with a history of T2DM, coronary artery disease, prior MI s/p PTCA with stent (2007), laparoscopic hand-assisted right hemicolectomy secondary to a cecal/appendiceal orifice villous adenoma with high grade dysplasia (2018), and a maternal or sororal history of colon cancer presenting for follow-up. She was last seen on 6/21/2021 for follow-up regarding personal history of advanced colon polyp, family history of colorectal cancer, chronic constipation.  She was scheduled for a colonoscopy, but the procedure was not completed. She reports since her last visit being told that she has stage III renal insufficiency.  Otherwise she is doing fairly well.  She has some difficulties with initiating a bowel movement but otherwise fairly normal consistency stool and regular bowel habits.  She has been taking Metamucil on an as-needed basis.  She denies any red blood per rectum, abdominal pain, unintentional weight loss. She has experienced some heartburn but no difficulty swallowing.  She reports that there is a family history of gastric cancer.  She denies any nausea, vomiting or NSAID use.

## 2021-09-21 PROBLEM — 236069009: Status: ACTIVE | Noted: 2021-06-21

## 2021-10-06 ENCOUNTER — OFFICE VISIT (OUTPATIENT)
Dept: URBAN - METROPOLITAN AREA MEDICAL CENTER 43 | Facility: MEDICAL CENTER | Age: 71
End: 2021-10-06
Payer: MEDICARE

## 2021-10-06 DIAGNOSIS — K21.9 ACID REFLUX: ICD-10-CM

## 2021-10-06 DIAGNOSIS — D12.5 ADENOMA OF SIGMOID COLON: ICD-10-CM

## 2021-10-06 DIAGNOSIS — K22.89 OTHER SPECIFIED DISEASE OF ESOPHAGUS: ICD-10-CM

## 2021-10-06 DIAGNOSIS — K29.60 ADENOPAPILLOMATOSIS GASTRICA: ICD-10-CM

## 2021-10-06 DIAGNOSIS — Z98.0 HISTORY OF BILLROTH II OPERATION: ICD-10-CM

## 2021-10-06 DIAGNOSIS — Z86.010 COLON POLYP HISTORY (ADENOMATOUS): ICD-10-CM

## 2021-10-06 DIAGNOSIS — K57.30 ACQUIRED DIVERTICULOSIS OF COLON: ICD-10-CM

## 2021-10-06 DIAGNOSIS — Z80.0 BROTHER AT YOUNG AGE FAMILY HISTORY OF COLON CANCER: ICD-10-CM

## 2021-10-06 DIAGNOSIS — K64.0 BLEEDING GRADE I HEMORRHOIDS: ICD-10-CM

## 2021-10-06 DIAGNOSIS — D12.3 ADENOMA OF TRANSVERSE COLON: ICD-10-CM

## 2021-10-06 PROCEDURE — 43239 EGD BIOPSY SINGLE/MULTIPLE: CPT | Performed by: INTERNAL MEDICINE

## 2021-10-06 PROCEDURE — 45385 COLONOSCOPY W/LESION REMOVAL: CPT | Performed by: INTERNAL MEDICINE

## 2021-10-06 RX ORDER — OXYCODONE AND ACETAMINOPHEN 10; 325 MG/1; MG/1
TK 1 T PO Q 8 H PRN TABLET ORAL
Qty: 21 | Refills: 0 | COMMUNITY
Start: 2018-12-31

## 2021-10-06 RX ORDER — PANTOPRAZOLE 20 MG/1
TAKE 1 TABLET DAILY TABLET, DELAYED RELEASE ORAL
Qty: 90 | Refills: 3 | Status: ACTIVE | COMMUNITY
Start: 2019-06-25

## 2021-10-06 RX ORDER — NYSTATIN AND TRIAMCINOLONE ACETONIDE 100000; 1 MG/G; MG/G
CREAM TOPICAL
Qty: 30 | Refills: 0 | Status: ACTIVE | COMMUNITY
Start: 2019-08-15

## 2021-10-06 RX ORDER — NEOMYCIN SULFATE, POLYMYXIN B SULFATE AND HYDROCORTISONE 3.5; 10000; 1 MG/ML; [IU]/ML; MG/ML
SOLUTION AURICULAR (OTIC)
Qty: 10 | Refills: 0 | COMMUNITY
Start: 2019-02-13

## 2021-10-06 RX ORDER — SITAGLIPTIN PHOSPHATE 100 MG
1 TABLET TABLET ORAL ONCE A DAY
Refills: 0 | Status: ACTIVE | COMMUNITY
Start: 2020-04-03

## 2021-10-06 RX ORDER — DOXAZOSIN MESYLATE 4 MG/1
1 TABLET TABLET ORAL ONCE A DAY
Refills: 0 | Status: ACTIVE | COMMUNITY
Start: 2020-03-12

## 2021-10-06 RX ORDER — PRAVASTATIN SODIUM 40 MG/1
TAKE 1 TABLET BY MOUTH AT BEDTIME TABLET ORAL
Qty: 90 | Refills: 0 | Status: ACTIVE | COMMUNITY
Start: 2018-07-06

## 2021-10-06 RX ORDER — BENZONATATE 100 MG/1
TK 1 C PO TID FOR 10 DAYS CAPSULE ORAL
Qty: 30 | Refills: 0 | Status: ON HOLD | COMMUNITY
Start: 2018-02-27

## 2021-10-06 RX ORDER — CARVEDILOL PHOSPHATE 40 MG/1
TAKE 1 CAPSULE EVERY MORNING WITH FOOD CAPSULE, EXTENDED RELEASE ORAL
Refills: 0 | Status: ACTIVE | COMMUNITY

## 2021-10-06 RX ORDER — GLIMEPIRIDE 1 MG/1
TAKE 1 TABLET DAILY TABLET ORAL
Refills: 0 | Status: ACTIVE | COMMUNITY

## 2021-10-06 RX ORDER — DOXAZOSIN 2 MG/1
TABLET ORAL
Qty: 90 | Refills: 0 | COMMUNITY
Start: 2019-05-09

## 2021-10-06 RX ORDER — METRONIDAZOLE 500 MG/1
TK 2 TS PO AT 4 PM ON THE DAY PRIOR TO SURGERY TABLET ORAL
Qty: 2 | Refills: 0 | COMMUNITY
Start: 2018-12-13

## 2021-10-06 RX ORDER — SITAGLIPTIN AND METFORMIN HYDROCHLORIDE 100; 1000 MG/1; MG/1
TK 1 T PO  QPM TABLET, FILM COATED, EXTENDED RELEASE ORAL
Qty: 90 | Refills: 0 | COMMUNITY
Start: 2018-07-08

## 2021-10-06 RX ORDER — NEOMYCIN SULFATE 500 MG
TK 2 TS PO AT 4 PM ON THE DAY PRIOR TO SURGERY TABLET ORAL
Qty: 2 | Refills: 0 | COMMUNITY
Start: 2018-12-13

## 2021-10-06 RX ORDER — CLINDAMYCIN HYDROCHLORIDE 300 MG/1
CAPSULE ORAL
Qty: 30 | Refills: 0 | Status: ON HOLD | COMMUNITY
Start: 2019-03-13

## 2021-10-06 RX ORDER — FLUCONAZOLE 150 MG/1
TABLET ORAL
Qty: 1 | Refills: 0 | COMMUNITY
Start: 2019-08-15

## 2021-10-06 RX ORDER — PIOGLITAZONE 30 MG/1
TABLET ORAL
Qty: 90 | Refills: 0 | COMMUNITY
Start: 2020-04-24

## 2021-10-06 RX ORDER — FOSINOPRIL SODIUM 20 MG/1
TABLET ORAL
Qty: 90 | Refills: 0 | COMMUNITY
Start: 2012-07-31

## 2021-10-06 RX ORDER — DOXYCYCLINE 100 MG/1
TK ONE C PO BID FOR 10 DAYS CAPSULE ORAL
Qty: 20 | Refills: 0 | COMMUNITY
Start: 2018-07-09

## 2021-10-06 RX ORDER — TIZANIDINE 4 MG/1
TK 1 T PO  BID PRN TABLET ORAL
Qty: 60 | Refills: 0 | COMMUNITY
Start: 2018-07-06

## 2021-10-06 RX ORDER — LOSARTAN POTASSIUM AND HYDROCHLOROTHIAZIDE 100; 25 MG/1; MG/1
TAKE 1 TABLET DAILY TABLET, FILM COATED ORAL
Refills: 0 | Status: ACTIVE | COMMUNITY

## 2021-10-06 RX ORDER — LEVOFLOXACIN 500 MG/1
TK 1 T PO ONCE DAILY TABLET, FILM COATED ORAL
Qty: 10 | Refills: 0 | COMMUNITY
Start: 2018-07-06

## 2021-10-06 RX ORDER — HYDROCODONE BITARTRATE AND ACETAMINOPHEN 7.5; 325 MG/1; MG/1
TK 1 T PO Q 12 H PRN TABLET ORAL
Qty: 15 | Refills: 0 | COMMUNITY
Start: 2018-07-09

## 2021-10-06 RX ORDER — CHLORPHENIRAMINE MALEATE, DEXTROMETHORPHAN HYDROBROMIDE AND PHENYLEPHRINE HYDROCHLORIDE 4; 10; 15 MG/5ML; MG/5ML; MG/5ML
TAKE 5 ML EVERY 6 HOURS AS NEEDED ORALLY LIQUID ORAL
Qty: 180 | Refills: 0 | COMMUNITY
Start: 2019-07-03

## 2021-10-06 RX ORDER — ERYTHROMYCIN 500 MG/1
TABLET, COATED ORAL
Qty: 20 | Refills: 0 | COMMUNITY
Start: 2019-03-21

## 2021-10-06 RX ORDER — AZITHROMYCIN DIHYDRATE 250 MG/1
TABLET, FILM COATED ORAL
Qty: 6 | Refills: 0 | Status: ON HOLD | COMMUNITY
Start: 2013-05-16

## 2021-10-06 RX ORDER — OFLOXACIN 3 MG/ML
SOLUTION AURICULAR (OTIC)
Qty: 10 | Refills: 0 | COMMUNITY
Start: 2020-04-13

## 2021-10-06 RX ORDER — PIOGLITAZONE 15 MG/1
TABLET ORAL
Qty: 90 | Refills: 0 | COMMUNITY
Start: 2020-04-03

## 2021-10-06 RX ORDER — DOXYCYCLINE 100 MG/1
TK 1 T PO BID TABLET, FILM COATED ORAL
Qty: 20 | Refills: 0 | COMMUNITY
Start: 2018-04-25

## 2021-10-06 RX ORDER — SIMVASTATIN 20 MG/1
TABLET, FILM COATED ORAL
Qty: 90 | Refills: 0 | COMMUNITY
Start: 2012-10-26

## 2021-10-06 RX ORDER — BLOOD SUGAR DIAGNOSTIC
STRIP MISCELLANEOUS
Qty: 200 | Refills: 0 | COMMUNITY
Start: 2019-10-24

## 2021-10-06 RX ORDER — HYDROCHLOROTHIAZIDE 25 MG/1
1 TABLET TABLET ORAL ONCE A DAY
Refills: 0 | COMMUNITY
Start: 2019-05-14

## 2021-10-06 RX ORDER — OXYCODONE AND ACETAMINOPHEN 5; 325 MG/1; MG/1
TK 1 T PO  Q 4 H PRN P TABLET ORAL
Qty: 40 | Refills: 0 | COMMUNITY
Start: 2018-12-17

## 2021-10-06 RX ORDER — TIZANIDINE HYDROCHLORIDE 4 MG/1
TK 1 C PO BID PRN P CAPSULE ORAL
Qty: 30 | Refills: 0 | COMMUNITY
Start: 2017-11-29

## 2021-10-06 RX ORDER — MELOXICAM 15 MG/1
TABLET ORAL
Qty: 90 | Refills: 0 | COMMUNITY
Start: 2012-08-20

## 2021-10-06 RX ORDER — GLIMEPIRIDE 4 MG/1
TK 1 T PO BID TABLET ORAL
Qty: 180 | Refills: 0 | COMMUNITY
Start: 2018-02-27

## 2021-10-06 RX ORDER — PRAVASTATIN SODIUM 10 MG/1
TAKE 1 TABLET AT BEDTIME TABLET ORAL
Refills: 0 | COMMUNITY

## 2021-10-06 RX ORDER — BLOOD-GLUCOSE METER
EACH MISCELLANEOUS
Qty: 1 | Refills: 0 | COMMUNITY
Start: 2019-10-25

## 2021-10-06 RX ORDER — PREGABALIN 75 MG/1
CAPSULE ORAL
Qty: 180 | Refills: 0 | COMMUNITY
Start: 2019-10-09

## 2021-10-06 RX ORDER — SITAGLIPTIN AND METFORMIN HYDROCHLORIDE 50; 1000 MG/1; MG/1
TAKE 1 TABLET TWICE DAILY WITH MEALS TABLET, FILM COATED ORAL
Qty: 180 | Refills: 0 | COMMUNITY
Start: 2012-11-02

## 2021-12-15 ENCOUNTER — OFFICE VISIT (OUTPATIENT)
Dept: URBAN - METROPOLITAN AREA CLINIC 107 | Facility: CLINIC | Age: 71
End: 2021-12-15

## 2021-12-28 ENCOUNTER — TELEPHONE ENCOUNTER (OUTPATIENT)
Dept: URBAN - METROPOLITAN AREA CLINIC 107 | Facility: CLINIC | Age: 71
End: 2021-12-28

## 2022-03-28 ENCOUNTER — OFFICE VISIT (OUTPATIENT)
Dept: URBAN - METROPOLITAN AREA CLINIC 107 | Facility: CLINIC | Age: 72
End: 2022-03-28
Payer: MEDICARE

## 2022-03-28 VITALS
HEART RATE: 70 BPM | WEIGHT: 212 LBS | BODY MASS INDEX: 36.19 KG/M2 | TEMPERATURE: 98.2 F | RESPIRATION RATE: 20 BRPM | SYSTOLIC BLOOD PRESSURE: 144 MMHG | HEIGHT: 64 IN | DIASTOLIC BLOOD PRESSURE: 78 MMHG

## 2022-03-28 DIAGNOSIS — Z86.010 HISTORY OF ADENOMATOUS POLYP OF COLON: ICD-10-CM

## 2022-03-28 DIAGNOSIS — K21.00 GASTROESOPHAGEAL REFLUX DISEASE WITH ESOPHAGITIS WITHOUT HEMORRHAGE: ICD-10-CM

## 2022-03-28 PROCEDURE — 99213 OFFICE O/P EST LOW 20 MIN: CPT | Performed by: INTERNAL MEDICINE

## 2022-03-28 RX ORDER — METRONIDAZOLE 500 MG/1
TK 2 TS PO AT 4 PM ON THE DAY PRIOR TO SURGERY TABLET ORAL
Qty: 2 | Refills: 0 | COMMUNITY
Start: 2018-12-13

## 2022-03-28 RX ORDER — LOSARTAN POTASSIUM AND HYDROCHLOROTHIAZIDE 100; 25 MG/1; MG/1
TAKE 1 TABLET DAILY TABLET, FILM COATED ORAL
Refills: 0 | Status: ACTIVE | COMMUNITY

## 2022-03-28 RX ORDER — SITAGLIPTIN AND METFORMIN HYDROCHLORIDE 100; 1000 MG/1; MG/1
TK 1 T PO  QPM TABLET, FILM COATED, EXTENDED RELEASE ORAL
Qty: 90 | Refills: 0 | COMMUNITY
Start: 2018-07-08

## 2022-03-28 RX ORDER — BLOOD-GLUCOSE METER
EACH MISCELLANEOUS
Qty: 1 | Refills: 0 | COMMUNITY
Start: 2019-10-25

## 2022-03-28 RX ORDER — BENZONATATE 100 MG/1
TK 1 C PO TID FOR 10 DAYS CAPSULE ORAL
Qty: 30 | Refills: 0 | Status: ON HOLD | COMMUNITY
Start: 2018-02-27

## 2022-03-28 RX ORDER — DOXAZOSIN 2 MG/1
TABLET ORAL
Qty: 90 | Refills: 0 | COMMUNITY
Start: 2019-05-09

## 2022-03-28 RX ORDER — PIOGLITAZONE 30 MG/1
TABLET ORAL
Qty: 90 | Refills: 0 | COMMUNITY
Start: 2020-04-24

## 2022-03-28 RX ORDER — FOSINOPRIL SODIUM 20 MG/1
TABLET ORAL
Qty: 90 | Refills: 0 | COMMUNITY
Start: 2012-07-31

## 2022-03-28 RX ORDER — TIZANIDINE HYDROCHLORIDE 4 MG/1
TK 1 C PO BID PRN P CAPSULE ORAL
Qty: 30 | Refills: 0 | COMMUNITY
Start: 2017-11-29

## 2022-03-28 RX ORDER — NEOMYCIN SULFATE, POLYMYXIN B SULFATE AND HYDROCORTISONE 3.5; 10000; 1 MG/ML; [IU]/ML; MG/ML
SOLUTION AURICULAR (OTIC)
Qty: 10 | Refills: 0 | COMMUNITY
Start: 2019-02-13

## 2022-03-28 RX ORDER — PRAVASTATIN SODIUM 10 MG/1
TAKE 1 TABLET AT BEDTIME TABLET ORAL
Refills: 0 | COMMUNITY

## 2022-03-28 RX ORDER — LEVOFLOXACIN 500 MG/1
TK 1 T PO ONCE DAILY TABLET, FILM COATED ORAL
Qty: 10 | Refills: 0 | COMMUNITY
Start: 2018-07-06

## 2022-03-28 RX ORDER — MELOXICAM 15 MG/1
TABLET ORAL
Qty: 90 | Refills: 0 | COMMUNITY
Start: 2012-08-20

## 2022-03-28 RX ORDER — OXYCODONE AND ACETAMINOPHEN 10; 325 MG/1; MG/1
TK 1 T PO Q 8 H PRN TABLET ORAL
Qty: 21 | Refills: 0 | COMMUNITY
Start: 2018-12-31

## 2022-03-28 RX ORDER — BLOOD SUGAR DIAGNOSTIC
STRIP MISCELLANEOUS
Qty: 200 | Refills: 0 | COMMUNITY
Start: 2019-10-24

## 2022-03-28 RX ORDER — SITAGLIPTIN AND METFORMIN HYDROCHLORIDE 50; 1000 MG/1; MG/1
TAKE 1 TABLET TWICE DAILY WITH MEALS TABLET, FILM COATED ORAL
Qty: 180 | Refills: 0 | COMMUNITY
Start: 2012-11-02

## 2022-03-28 RX ORDER — CLINDAMYCIN HYDROCHLORIDE 300 MG/1
CAPSULE ORAL
Qty: 30 | Refills: 0 | Status: ON HOLD | COMMUNITY
Start: 2019-03-13

## 2022-03-28 RX ORDER — PIOGLITAZONE 15 MG/1
TABLET ORAL
Qty: 90 | Refills: 0 | COMMUNITY
Start: 2020-04-03

## 2022-03-28 RX ORDER — DOXYCYCLINE 100 MG/1
TK 1 T PO BID TABLET, FILM COATED ORAL
Qty: 20 | Refills: 0 | COMMUNITY
Start: 2018-04-25

## 2022-03-28 RX ORDER — GLIMEPIRIDE 1 MG/1
TAKE 1 TABLET DAILY TABLET ORAL
Refills: 0 | Status: ACTIVE | COMMUNITY

## 2022-03-28 RX ORDER — GLIMEPIRIDE 4 MG/1
TK 1 T PO BID TABLET ORAL
Qty: 180 | Refills: 0 | COMMUNITY
Start: 2018-02-27

## 2022-03-28 RX ORDER — OFLOXACIN 3 MG/ML
SOLUTION AURICULAR (OTIC)
Qty: 10 | Refills: 0 | COMMUNITY
Start: 2020-04-13

## 2022-03-28 RX ORDER — PRAVASTATIN SODIUM 40 MG/1
TAKE 1 TABLET BY MOUTH AT BEDTIME TABLET ORAL
Qty: 90 | Refills: 0 | Status: ACTIVE | COMMUNITY
Start: 2018-07-06

## 2022-03-28 RX ORDER — HYDROCODONE BITARTRATE AND ACETAMINOPHEN 7.5; 325 MG/1; MG/1
TK 1 T PO Q 12 H PRN TABLET ORAL
Qty: 15 | Refills: 0 | COMMUNITY
Start: 2018-07-09

## 2022-03-28 RX ORDER — FLUCONAZOLE 150 MG/1
TABLET ORAL
Qty: 1 | Refills: 0 | COMMUNITY
Start: 2019-08-15

## 2022-03-28 RX ORDER — AZITHROMYCIN DIHYDRATE 250 MG/1
TABLET, FILM COATED ORAL
Qty: 6 | Refills: 0 | Status: ON HOLD | COMMUNITY
Start: 2013-05-16

## 2022-03-28 RX ORDER — OXYCODONE AND ACETAMINOPHEN 5; 325 MG/1; MG/1
TK 1 T PO  Q 4 H PRN P TABLET ORAL
Qty: 40 | Refills: 0 | COMMUNITY
Start: 2018-12-17

## 2022-03-28 RX ORDER — PANTOPRAZOLE 20 MG/1
TAKE 1 TABLET DAILY TABLET, DELAYED RELEASE ORAL
Qty: 90 | Refills: 3 | Status: ACTIVE | COMMUNITY
Start: 2019-06-25

## 2022-03-28 RX ORDER — SITAGLIPTIN PHOSPHATE 100 MG
1 TABLET TABLET ORAL ONCE A DAY
Refills: 0 | Status: ACTIVE | COMMUNITY
Start: 2020-04-03

## 2022-03-28 RX ORDER — HYDROCHLOROTHIAZIDE 25 MG/1
1 TABLET TABLET ORAL ONCE A DAY
Refills: 0 | COMMUNITY
Start: 2019-05-14

## 2022-03-28 RX ORDER — NYSTATIN AND TRIAMCINOLONE ACETONIDE 100000; 1 MG/G; MG/G
CREAM TOPICAL
Qty: 30 | Refills: 0 | Status: ACTIVE | COMMUNITY
Start: 2019-08-15

## 2022-03-28 RX ORDER — PREGABALIN 75 MG/1
CAPSULE ORAL
Qty: 180 | Refills: 0 | COMMUNITY
Start: 2019-10-09

## 2022-03-28 RX ORDER — CHLORPHENIRAMINE MALEATE, DEXTROMETHORPHAN HYDROBROMIDE AND PHENYLEPHRINE HYDROCHLORIDE 4; 10; 15 MG/5ML; MG/5ML; MG/5ML
TAKE 5 ML EVERY 6 HOURS AS NEEDED ORALLY LIQUID ORAL
Qty: 180 | Refills: 0 | COMMUNITY
Start: 2019-07-03

## 2022-03-28 RX ORDER — ERYTHROMYCIN 500 MG/1
TABLET, COATED ORAL
Qty: 20 | Refills: 0 | COMMUNITY
Start: 2019-03-21

## 2022-03-28 RX ORDER — NEOMYCIN SULFATE 500 MG
TK 2 TS PO AT 4 PM ON THE DAY PRIOR TO SURGERY TABLET ORAL
Qty: 2 | Refills: 0 | COMMUNITY
Start: 2018-12-13

## 2022-03-28 RX ORDER — PANTOPRAZOLE 20 MG/1
TAKE 1 TABLET DAILY TABLET, DELAYED RELEASE ORAL ONCE A DAY
Qty: 90 | Refills: 3
Start: 2019-06-25

## 2022-03-28 RX ORDER — DOXYCYCLINE 100 MG/1
TK ONE C PO BID FOR 10 DAYS CAPSULE ORAL
Qty: 20 | Refills: 0 | COMMUNITY
Start: 2018-07-09

## 2022-03-28 RX ORDER — CARVEDILOL PHOSPHATE 40 MG/1
TAKE 1 CAPSULE EVERY MORNING WITH FOOD CAPSULE, EXTENDED RELEASE ORAL
Refills: 0 | Status: ACTIVE | COMMUNITY

## 2022-03-28 RX ORDER — DOXAZOSIN MESYLATE 4 MG/1
1 TABLET TABLET ORAL ONCE A DAY
Refills: 0 | Status: ACTIVE | COMMUNITY
Start: 2020-03-12

## 2022-03-28 RX ORDER — TIZANIDINE 4 MG/1
TK 1 T PO  BID PRN TABLET ORAL
Qty: 60 | Refills: 0 | COMMUNITY
Start: 2018-07-06

## 2022-03-28 RX ORDER — SIMVASTATIN 20 MG/1
TABLET, FILM COATED ORAL
Qty: 90 | Refills: 0 | COMMUNITY
Start: 2012-10-26

## 2022-03-28 NOTE — HPI-TODAY'S VISIT:
Ms. Power is a 71-year-old woman with a history of T2DM, coronary artery disease, prior MI s/p PTCA with stent (2007), laparoscopic hand-assisted right hemicolectomy secondary to a cecal/appendiceal orifice villous adenoma with high grade dysplasia (2018), and a maternal or sororal history of colon cancer presenting for follow-up after EGD and colonoscopy.  She underwent an EGD for a history of GERD and family history of gastric cancer, and colonoscopy for family history of colon cancer and personal history of colon adenomas on 10/6/2021 at [facility]. EGD: Irregular Z-line otherwise normal esophagus, nonerosive gastropathy status post biopsy negative for H. pylori, normal duodenum. Colonoscopy: Changes from a right hemicolectomy, removal of 4 tubular adenomas (4-5 mm) from the sigmoid colon (1) and transverse colon (3), patent end-to-side ileocolonic anastomosis, sigmoid colon diverticulosis, grade 1 internal hemorrhoids.  She reports that she is overall doing well.  She has had some worsening constipation while taking oxycodone following lower back surgery.  She denies any red blood per rectum.  Her GERD symptoms are currently well controlled on pantoprazole 20 mg daily.  No dysphagia.  Her weight has been stable.

## 2022-04-14 PROBLEM — 429047008: Status: ACTIVE | Noted: 2021-06-21

## 2022-04-14 PROBLEM — 266433003: Status: ACTIVE | Noted: 2022-03-28

## 2023-01-01 ENCOUNTER — ERX REFILL RESPONSE (OUTPATIENT)
Dept: URBAN - METROPOLITAN AREA CLINIC 113 | Facility: CLINIC | Age: 73
End: 2023-01-01

## 2023-01-01 ENCOUNTER — CLAIMS CREATED FROM THE CLAIM WINDOW (OUTPATIENT)
Dept: URBAN - METROPOLITAN AREA SURGERY CENTER 25 | Facility: SURGERY CENTER | Age: 73
End: 2023-01-01

## 2023-01-01 ENCOUNTER — TELEPHONE ENCOUNTER (OUTPATIENT)
Dept: URBAN - METROPOLITAN AREA CLINIC 107 | Facility: CLINIC | Age: 73
End: 2023-01-01

## 2023-01-01 ENCOUNTER — OFFICE VISIT (OUTPATIENT)
Dept: URBAN - METROPOLITAN AREA CLINIC 113 | Facility: CLINIC | Age: 73
End: 2023-01-01

## 2023-01-01 ENCOUNTER — OFFICE VISIT (OUTPATIENT)
Dept: URBAN - METROPOLITAN AREA SURGERY CENTER 25 | Facility: SURGERY CENTER | Age: 73
End: 2023-01-01
Payer: MEDICARE

## 2023-01-01 ENCOUNTER — OFFICE VISIT (OUTPATIENT)
Dept: URBAN - METROPOLITAN AREA CLINIC 107 | Facility: CLINIC | Age: 73
End: 2023-01-01
Payer: MEDICARE

## 2023-01-01 ENCOUNTER — TELEPHONE ENCOUNTER (OUTPATIENT)
Dept: URBAN - METROPOLITAN AREA CLINIC 113 | Facility: CLINIC | Age: 73
End: 2023-01-01

## 2023-01-01 ENCOUNTER — CLAIMS CREATED FROM THE CLAIM WINDOW (OUTPATIENT)
Dept: URBAN - METROPOLITAN AREA CLINIC 4 | Facility: CLINIC | Age: 73
End: 2023-01-01
Payer: MEDICARE

## 2023-01-01 ENCOUNTER — DASHBOARD ENCOUNTERS (OUTPATIENT)
Age: 73
End: 2023-01-01

## 2023-01-01 ENCOUNTER — LAB OUTSIDE AN ENCOUNTER (OUTPATIENT)
Dept: URBAN - METROPOLITAN AREA CLINIC 113 | Facility: CLINIC | Age: 73
End: 2023-01-01

## 2023-01-01 VITALS
TEMPERATURE: 96.6 F | SYSTOLIC BLOOD PRESSURE: 144 MMHG | TEMPERATURE: 96.9 F | DIASTOLIC BLOOD PRESSURE: 76 MMHG | WEIGHT: 214 LBS | HEIGHT: 64 IN | HEART RATE: 71 BPM | BODY MASS INDEX: 36.54 KG/M2 | BODY MASS INDEX: 37.05 KG/M2 | RESPIRATION RATE: 20 BRPM | WEIGHT: 217 LBS | HEIGHT: 64 IN | RESPIRATION RATE: 20 BRPM | SYSTOLIC BLOOD PRESSURE: 195 MMHG | HEART RATE: 75 BPM | DIASTOLIC BLOOD PRESSURE: 80 MMHG

## 2023-01-01 DIAGNOSIS — R93.3 ABN FINDINGS-GI TRACT: ICD-10-CM

## 2023-01-01 DIAGNOSIS — K21.9 GERD WITHOUT ESOPHAGITIS: ICD-10-CM

## 2023-01-01 DIAGNOSIS — Z80.0 FAMILY HISTORY OF COLON CANCER: ICD-10-CM

## 2023-01-01 DIAGNOSIS — D12.5 ADENOMA OF SIGMOID COLON: ICD-10-CM

## 2023-01-01 DIAGNOSIS — Z98.0 INTESTINAL ANASTOMOSIS PRESENT: ICD-10-CM

## 2023-01-01 DIAGNOSIS — K59.04 CHRONIC IDIOPATHIC CONSTIPATION: ICD-10-CM

## 2023-01-01 DIAGNOSIS — K58.9 COLON SPASM: ICD-10-CM

## 2023-01-01 DIAGNOSIS — R07.89 NON-CARDIAC CHEST PAIN: ICD-10-CM

## 2023-01-01 DIAGNOSIS — K64.0 GRADE I INTERNAL HEMORRHOIDS: ICD-10-CM

## 2023-01-01 DIAGNOSIS — D12.5 ADENOMATOUS POLYP OF SIGMOID COLON: ICD-10-CM

## 2023-01-01 DIAGNOSIS — Z86.010 HISTORY OF ADENOMATOUS POLYP OF COLON: ICD-10-CM

## 2023-01-01 DIAGNOSIS — K64.0 GRADE I HEMORRHOIDS: ICD-10-CM

## 2023-01-01 DIAGNOSIS — K21.00 GASTROESOPHAGEAL REFLUX DISEASE WITH ESOPHAGITIS WITHOUT HEMORRHAGE: ICD-10-CM

## 2023-01-01 DIAGNOSIS — D12.5 BENIGN NEOPLASM OF SIGMOID COLON: ICD-10-CM

## 2023-01-01 PROCEDURE — 88305 TISSUE EXAM BY PATHOLOGIST: CPT | Performed by: PATHOLOGY

## 2023-01-01 PROCEDURE — 99214 OFFICE O/P EST MOD 30 MIN: CPT | Performed by: INTERNAL MEDICINE

## 2023-01-01 PROCEDURE — 00811 ANES LWR INTST NDSC NOS: CPT | Performed by: ANESTHESIOLOGIST ASSISTANT

## 2023-01-01 PROCEDURE — 99213 OFFICE O/P EST LOW 20 MIN: CPT | Performed by: INTERNAL MEDICINE

## 2023-01-01 PROCEDURE — 00811 ANES LWR INTST NDSC NOS: CPT | Performed by: ANESTHESIOLOGY

## 2023-01-01 PROCEDURE — 45385 COLONOSCOPY W/LESION REMOVAL: CPT | Performed by: INTERNAL MEDICINE

## 2023-01-01 PROCEDURE — G8907 PT DOC NO EVENTS ON DISCHARG: HCPCS | Performed by: INTERNAL MEDICINE

## 2023-01-01 RX ORDER — NEOMYCIN SULFATE, POLYMYXIN B SULFATE AND HYDROCORTISONE 3.5; 10000; 1 MG/ML; [IU]/ML; MG/ML
SOLUTION AURICULAR (OTIC)
Qty: 10 | Refills: 0 | COMMUNITY
Start: 2019-02-13

## 2023-01-01 RX ORDER — PIOGLITAZONE 30 MG/1
TABLET ORAL
Qty: 90 | Refills: 0 | COMMUNITY
Start: 2020-04-24

## 2023-01-01 RX ORDER — NEOMYCIN SULFATE 500 MG
TK 2 TS PO AT 4 PM ON THE DAY PRIOR TO SURGERY TABLET ORAL
Qty: 2 | Refills: 0 | COMMUNITY
Start: 2018-12-13

## 2023-01-01 RX ORDER — HYDROCHLOROTHIAZIDE 25 MG/1
1 TABLET TABLET ORAL ONCE A DAY
Refills: 0 | COMMUNITY
Start: 2019-05-14

## 2023-01-01 RX ORDER — OXYCODONE AND ACETAMINOPHEN 10; 325 MG/1; MG/1
TK 1 T PO Q 8 H PRN TABLET ORAL
Qty: 21 | Refills: 0 | COMMUNITY
Start: 2018-12-31

## 2023-01-01 RX ORDER — PANTOPRAZOLE 20 MG/1
TAKE 1 TABLET DAILY TABLET, DELAYED RELEASE ORAL ONCE A DAY
Qty: 90 | Refills: 3 | Status: ACTIVE | COMMUNITY
Start: 2019-06-25

## 2023-01-01 RX ORDER — METRONIDAZOLE 500 MG/1
TK 2 TS PO AT 4 PM ON THE DAY PRIOR TO SURGERY TABLET ORAL
Qty: 2 | Refills: 0 | COMMUNITY
Start: 2018-12-13

## 2023-01-01 RX ORDER — GLIMEPIRIDE 1 MG/1
TAKE 1 TABLET DAILY TABLET ORAL
Refills: 0 | Status: DISCONTINUED | COMMUNITY

## 2023-01-01 RX ORDER — TIZANIDINE HYDROCHLORIDE 4 MG/1
TK 1 C PO BID PRN P CAPSULE ORAL
Qty: 30 | Refills: 0 | COMMUNITY
Start: 2017-11-29

## 2023-01-01 RX ORDER — LEVOFLOXACIN 500 MG/1
TK 1 T PO ONCE DAILY TABLET, FILM COATED ORAL
Qty: 10 | Refills: 0 | COMMUNITY
Start: 2018-07-06

## 2023-01-01 RX ORDER — CLINDAMYCIN HYDROCHLORIDE 300 MG/1
CAPSULE ORAL
Qty: 30 | Refills: 0 | Status: ON HOLD | COMMUNITY
Start: 2019-03-13

## 2023-01-01 RX ORDER — OXYCODONE AND ACETAMINOPHEN 5; 325 MG/1; MG/1
TK 1 T PO  Q 4 H PRN P TABLET ORAL
Qty: 40 | Refills: 0 | COMMUNITY
Start: 2018-12-17

## 2023-01-01 RX ORDER — MELOXICAM 15 MG/1
TABLET ORAL
Qty: 90 | Refills: 0 | COMMUNITY
Start: 2012-08-20

## 2023-01-01 RX ORDER — DOXYCYCLINE 100 MG/1
TK ONE C PO BID FOR 10 DAYS CAPSULE ORAL
Qty: 20 | Refills: 0 | COMMUNITY
Start: 2018-07-09

## 2023-01-01 RX ORDER — CHLORPHENIRAMINE MALEATE, DEXTROMETHORPHAN HYDROBROMIDE AND PHENYLEPHRINE HYDROCHLORIDE 4; 10; 15 MG/5ML; MG/5ML; MG/5ML
TAKE 5 ML EVERY 6 HOURS AS NEEDED ORALLY LIQUID ORAL
Qty: 180 | Refills: 0 | COMMUNITY
Start: 2019-07-03

## 2023-01-01 RX ORDER — TIZANIDINE 4 MG/1
TK 1 T PO  BID PRN TABLET ORAL
Qty: 60 | Refills: 0 | COMMUNITY
Start: 2018-07-06

## 2023-01-01 RX ORDER — PREGABALIN 75 MG/1
CAPSULE ORAL
Qty: 180 | Refills: 0 | COMMUNITY
Start: 2019-10-09

## 2023-01-01 RX ORDER — BLOOD-GLUCOSE METER
EACH MISCELLANEOUS
Qty: 1 | Refills: 0 | COMMUNITY
Start: 2019-10-25

## 2023-01-01 RX ORDER — AZITHROMYCIN DIHYDRATE 250 MG/1
TABLET, FILM COATED ORAL
Qty: 6 | Refills: 0 | Status: ON HOLD | COMMUNITY
Start: 2013-05-16

## 2023-01-01 RX ORDER — GLIMEPIRIDE 4 MG/1
TK 1 T PO BID TABLET ORAL
Qty: 180 | Refills: 0 | COMMUNITY
Start: 2018-02-27

## 2023-01-01 RX ORDER — BLOOD SUGAR DIAGNOSTIC
STRIP MISCELLANEOUS
Qty: 200 | Refills: 0 | COMMUNITY
Start: 2019-10-24

## 2023-01-01 RX ORDER — PANTOPRAZOLE SODIUM 40 MG/1
1 TABLET 30 MINUTES BEFORE A MEAL TABLET, DELAYED RELEASE ORAL TWICE A DAY
Qty: 60 | Refills: 5 | Status: ACTIVE | COMMUNITY
Start: 2019-06-25

## 2023-01-01 RX ORDER — FLUCONAZOLE 150 MG/1
TABLET ORAL
Qty: 1 | Refills: 0 | COMMUNITY
Start: 2019-08-15

## 2023-01-01 RX ORDER — HYDROCHLOROTHIAZIDE 25 MG/1
1 TABLET IN THE MORNING TABLET ORAL ONCE A DAY
Status: ACTIVE | COMMUNITY
Start: 2023-01-01

## 2023-01-01 RX ORDER — PRAVASTATIN SODIUM 10 MG/1
TAKE 1 TABLET AT BEDTIME TABLET ORAL
Refills: 0 | Status: ACTIVE | COMMUNITY

## 2023-01-01 RX ORDER — PIOGLITAZONE 15 MG/1
TABLET ORAL
Qty: 90 | Refills: 0 | COMMUNITY
Start: 2020-04-03

## 2023-01-01 RX ORDER — METOCLOPRAMIDE 10 MG/1
TAKE 1 TABLET BY MOUTH BEFORE MEALS TWICE A DAY TABLET ORAL
Qty: 60 TABLET | Refills: 1 | OUTPATIENT

## 2023-01-01 RX ORDER — DOXAZOSIN 2 MG/1
TABLET ORAL
Qty: 90 | Refills: 0 | COMMUNITY
Start: 2019-05-09

## 2023-01-01 RX ORDER — PANTOPRAZOLE SODIUM 40 MG/1
TAKE 1 TABLET BY MOUTH TWICE A DAY TABLET, DELAYED RELEASE ORAL
Qty: 180 TABLET | Refills: 2 | OUTPATIENT

## 2023-01-01 RX ORDER — NYSTATIN AND TRIAMCINOLONE ACETONIDE 100000; 1 MG/G; MG/G
CREAM TOPICAL
Qty: 30 | Refills: 0 | Status: DISCONTINUED | COMMUNITY
Start: 2019-08-15

## 2023-01-01 RX ORDER — LOSARTAN POTASSIUM AND HYDROCHLOROTHIAZIDE 100; 25 MG/1; MG/1
TAKE 1 TABLET DAILY TABLET, FILM COATED ORAL
Refills: 0 | Status: ACTIVE | COMMUNITY

## 2023-01-01 RX ORDER — FOSINOPRIL SODIUM 20 MG/1
TABLET ORAL
Qty: 90 | Refills: 0 | COMMUNITY
Start: 2012-07-31

## 2023-01-01 RX ORDER — METOCLOPRAMIDE 10 MG/1
1 TABLET BEFORE MEALS TABLET ORAL
OUTPATIENT

## 2023-01-01 RX ORDER — SIMVASTATIN 20 MG/1
TABLET, FILM COATED ORAL
Qty: 90 | Refills: 0 | COMMUNITY
Start: 2012-10-26

## 2023-01-01 RX ORDER — PANTOPRAZOLE SODIUM 40 MG/1
TAKE 1 TABLET BY MOUTH TWICE A DAY TABLET, DELAYED RELEASE ORAL
OUTPATIENT

## 2023-01-01 RX ORDER — HYDROCODONE BITARTRATE AND ACETAMINOPHEN 7.5; 325 MG/1; MG/1
TK 1 T PO Q 12 H PRN TABLET ORAL
Qty: 15 | Refills: 0 | COMMUNITY
Start: 2018-07-09

## 2023-01-01 RX ORDER — DOXAZOSIN MESYLATE 4 MG/1
1 TABLET TABLET ORAL ONCE A DAY
Refills: 0 | Status: DISCONTINUED | COMMUNITY
Start: 2020-03-12

## 2023-01-01 RX ORDER — PANTOPRAZOLE SODIUM 40 MG/1
1 TABLET 30 MINUTES BEFORE A MEAL TABLET, DELAYED RELEASE ORAL TWICE A DAY
Qty: 60 | Refills: 5
Start: 2019-06-25

## 2023-01-01 RX ORDER — ERYTHROMYCIN 500 MG/1
TABLET, COATED ORAL
Qty: 20 | Refills: 0 | COMMUNITY
Start: 2019-03-21

## 2023-01-01 RX ORDER — SITAGLIPTIN AND METFORMIN HYDROCHLORIDE 50; 1000 MG/1; MG/1
TAKE 1 TABLET TWICE DAILY WITH MEALS TABLET, FILM COATED ORAL
Qty: 180 | Refills: 0 | COMMUNITY
Start: 2012-11-02

## 2023-01-01 RX ORDER — BENZONATATE 100 MG/1
TK 1 C PO TID FOR 10 DAYS CAPSULE ORAL
Qty: 30 | Refills: 0 | Status: ON HOLD | COMMUNITY
Start: 2018-02-27

## 2023-01-01 RX ORDER — PRAVASTATIN SODIUM 10 MG/1
TAKE 1 TABLET AT BEDTIME TABLET ORAL
Refills: 0 | COMMUNITY

## 2023-01-01 RX ORDER — ONDANSETRON HYDROCHLORIDE 8 MG/1
1 TABLET AS NEEDED TABLET, FILM COATED ORAL
Qty: 30 | Refills: 1 | OUTPATIENT
Start: 2023-01-01

## 2023-01-01 RX ORDER — ONDANSETRON HYDROCHLORIDE 8 MG/1
1 TABLET AS NEEDED TABLET, FILM COATED ORAL
Qty: 30 | Refills: 1 | Status: ACTIVE | COMMUNITY
Start: 2023-01-01

## 2023-01-01 RX ORDER — PANTOPRAZOLE SODIUM 40 MG/1
TAKE 1 TABLET BY MOUTH TWICE A DAY TABLET, DELAYED RELEASE ORAL
Qty: 180 TABLET | Refills: 1 | OUTPATIENT

## 2023-01-01 RX ORDER — SITAGLIPTIN PHOSPHATE 100 MG
1 TABLET TABLET ORAL ONCE A DAY
Refills: 0 | Status: ACTIVE | COMMUNITY
Start: 2020-04-03

## 2023-01-01 RX ORDER — METOCLOPRAMIDE 10 MG/1
1 TABLET BEFORE MEALS TABLET ORAL TWICE A DAY
Qty: 60 | Refills: 1 | OUTPATIENT

## 2023-01-01 RX ORDER — METOCLOPRAMIDE 10 MG/1
1 TABLET BEFORE MEALS TABLET ORAL TWICE A DAY
Qty: 60 | Refills: 1 | Status: ACTIVE | COMMUNITY
Start: 2023-01-01

## 2023-01-01 RX ORDER — CARVEDILOL 25 MG/1
1 TABLET WITH FOOD TABLET, FILM COATED ORAL TWICE A DAY
Status: ACTIVE | COMMUNITY
Start: 2023-01-01

## 2023-01-01 RX ORDER — CARVEDILOL PHOSPHATE 40 MG/1
TAKE 1 CAPSULE EVERY MORNING WITH FOOD CAPSULE, EXTENDED RELEASE ORAL
Refills: 0 | Status: ON HOLD | COMMUNITY

## 2023-01-01 RX ORDER — OFLOXACIN 3 MG/ML
SOLUTION AURICULAR (OTIC)
Qty: 10 | Refills: 0 | COMMUNITY
Start: 2020-04-13

## 2023-01-01 RX ORDER — SITAGLIPTIN AND METFORMIN HYDROCHLORIDE 100; 1000 MG/1; MG/1
TK 1 T PO  QPM TABLET, FILM COATED, EXTENDED RELEASE ORAL
Qty: 90 | Refills: 0 | COMMUNITY
Start: 2018-07-08

## 2023-01-01 RX ORDER — METOCLOPRAMIDE 10 MG/1
1 TABLET BEFORE MEALS TABLET ORAL TWICE A DAY
Qty: 60 | Refills: 1 | OUTPATIENT
Start: 2023-01-01

## 2023-01-01 RX ORDER — DOXYCYCLINE 100 MG/1
TK 1 T PO BID TABLET, FILM COATED ORAL
Qty: 20 | Refills: 0 | COMMUNITY
Start: 2018-04-25

## 2023-01-01 RX ORDER — PRAVASTATIN SODIUM 40 MG/1
TAKE 1 TABLET BY MOUTH AT BEDTIME TABLET ORAL
Qty: 90 | Refills: 0 | Status: DISCONTINUED | COMMUNITY
Start: 2018-07-06

## 2023-01-01 RX ORDER — GLIMEPIRIDE 4 MG/1
1 TABLET WITH BREAKFAST OR THE FIRST MAIN MEAL OF THE DAY TABLET ORAL ONCE A DAY
Status: ACTIVE | COMMUNITY
Start: 2023-01-01

## 2023-01-01 RX ORDER — METOCLOPRAMIDE 10 MG/1
TAKE 1 TABLET BY MOUTH BEFORE MEALS TWICE A DAY TABLET ORAL
Qty: 60 TABLET | Refills: 1 | Status: ACTIVE | COMMUNITY

## 2023-01-01 RX ORDER — POLYETHYLENE GLYCOL 3350, SODIUM CHLORIDE, SODIUM BICARBONATE, POTASSIUM CHLORIDE 420; 11.2; 5.72; 1.48 G/4L; G/4L; G/4L; G/4L
AS DIRECTED POWDER, FOR SOLUTION ORAL ONCE
Qty: 1 KIT | OUTPATIENT
Start: 2023-01-01 | End: 2023-01-01

## 2023-01-01 RX ORDER — PANTOPRAZOLE SODIUM 40 MG/1
TAKE 1 TABLET BY MOUTH TWICE A DAY TABLET, DELAYED RELEASE ORAL
Qty: 180 TABLET | Refills: 1 | Status: ACTIVE | COMMUNITY

## 2023-04-20 PROBLEM — 82934008: Status: ACTIVE | Noted: 2023-01-01

## 2023-04-20 PROBLEM — 274668005: Status: ACTIVE | Noted: 2023-01-01

## 2023-04-20 NOTE — HPI-OTHER HISTORIES
She underwent an EGD for a history of GERD and family history of gastric cancer, and colonoscopy for family history of colon cancer and personal history of colon adenomas on 10/6/2021 at [facility]. EGD: Irregular Z-line otherwise normal esophagus, nonerosive gastropathy status post biopsy negative for H. pylori, normal duodenum. Colonoscopy: Changes from a right hemicolectomy, removal of 4 tubular adenomas (4-5 mm) from the sigmoid colon (1) and transverse colon (3), patent end-to-side ileocolonic anastomosis, sigmoid colon diverticulosis, grade 1 internal hemorrhoids.

## 2023-04-20 NOTE — HPI-TODAY'S VISIT:
Ms. Power is a 72-year-old woman with a history of T2DM, coronary artery disease, prior MI s/p PTCA with stent (2007), laparoscopic hand-assisted right hemicolectomy secondary to a cecal/appendiceal orifice villous adenoma with high grade dysplasia (2018), and a maternal or sororal history of colon cancer presenting for follow-up regarding worsening GERD.  She reports that her symptoms have been worse for the past 3 weeks with increased reflux and chest tightening.  She has not increased her Protonix to 40 mg without any improvement.  She has no nausea or vomiting.  She also reports recent constipation.  She has undergone a cardiac evaluation of her chest pain at [facility] that was negative.

## 2023-08-25 NOTE — HPI-TODAY'S VISIT:
Ms. Power is a 72-year-old woman with a history of T2DM, coronary artery disease, prior MI s/p PTCA with stent (2007), laparoscopic hand-assisted right hemicolectomy secondary to a cecal/appendiceal orifice villous adenoma with high grade dysplasia (2018), and a maternal or sororal history of colon cancer presenting for follow-up regarding GERD, atypical chest pain and s/p colonoscopy to evaluate an abnormal sigmoid colon on CT.  She was seen in consultation at [facility] for the evaluation of GERD, nausea vomiting, CT abdomen showing sigmoid colon lesion with calcifications.  Due to her persistent GERD and nausea with vomiting not improved with outpatient medications, an EGD was performed on 5/12/2023.  The findings are notable for normal esophagus, normal stomach, normal duodenum.  Gastric biopsies showed nonspecific chronic inactive inflammation.  She was discharged with plans to perform colonoscopy as an outpatient.  A colonoscopy was performed on 6/29/2023 for follow-up regarding a CT scan showing question of a filling defect in the sigmoid colon.  The findings were notable for a normal patent end-to-side ileocolonic anastomosis in the ascending colon, removal of 4-5 mm sigmoid colon tubular adenoma, sigmoid colon diverticulosis, spasm throughout the colon, grade 1 internal hemorrhoids.  She reports that she continues to have episodes of chest pain.  She has taking pantoprazole 40 mg twice daily.  She denies any exacerbating factors such as exertion.  She denies any regurgitation or heartburn.  She has been taking Reglan about 3 times per week with improvement in "episodes".  She denies any pain or difficulty swallowing.  Her bowel movements are fairly regular and normal consistency.  No melena or red blood per rectum.

## 2023-08-25 NOTE — HPI-OTHER HISTORIES
She underwent an EGD for a history of GERD and family history of gastric cancer, and colonoscopy for family history of colon cancer and personal history of colon adenomas on 10/6/2021 at [facility]. EGD: Irregular Z-line otherwise normal esophagus, nonerosive gastropathy status post biopsy negative for H. pylori, normal duodenum. Colonoscopy: Changes from a right hemicolectomy, removal of 4 tubular adenomas (4-5 mm) from the sigmoid colon (1) and transverse colon (3), patent end-to-side ileocolonic anastomosis, sigmoid colon diverticulosis, grade 1 internal hemorrhoids.

## 2023-09-13 PROBLEM — 266435005: Status: ACTIVE | Noted: 2023-01-01

## 2023-12-06 ENCOUNTER — OFFICE VISIT (OUTPATIENT)
Dept: URBAN - METROPOLITAN AREA CLINIC 107 | Facility: CLINIC | Age: 73
End: 2023-12-06

## 2023-12-06 RX ORDER — LOSARTAN POTASSIUM AND HYDROCHLOROTHIAZIDE 100; 25 MG/1; MG/1
TAKE 1 TABLET DAILY TABLET, FILM COATED ORAL
Refills: 0 | Status: ACTIVE | COMMUNITY

## 2023-12-06 RX ORDER — ONDANSETRON HYDROCHLORIDE 8 MG/1
1 TABLET AS NEEDED TABLET, FILM COATED ORAL
Qty: 30 | Refills: 1 | Status: ACTIVE | COMMUNITY
Start: 2023-01-01

## 2023-12-06 RX ORDER — GLIMEPIRIDE 4 MG/1
1 TABLET WITH BREAKFAST OR THE FIRST MAIN MEAL OF THE DAY TABLET ORAL ONCE A DAY
Status: ACTIVE | COMMUNITY
Start: 2023-01-01

## 2023-12-06 RX ORDER — METOCLOPRAMIDE 10 MG/1
1 TABLET BEFORE MEALS TABLET ORAL
Status: ACTIVE | COMMUNITY

## 2023-12-06 RX ORDER — PANTOPRAZOLE SODIUM 40 MG/1
TAKE 1 TABLET BY MOUTH TWICE A DAY TABLET, DELAYED RELEASE ORAL
Status: ACTIVE | COMMUNITY

## 2023-12-06 RX ORDER — CARVEDILOL 25 MG/1
1 TABLET WITH FOOD TABLET, FILM COATED ORAL TWICE A DAY
Status: ACTIVE | COMMUNITY
Start: 2023-01-01

## 2023-12-06 RX ORDER — SITAGLIPTIN PHOSPHATE 100 MG
1 TABLET TABLET ORAL ONCE A DAY
Refills: 0 | Status: ACTIVE | COMMUNITY
Start: 2020-04-03

## 2023-12-06 RX ORDER — PRAVASTATIN SODIUM 10 MG/1
TAKE 1 TABLET AT BEDTIME TABLET ORAL
Refills: 0 | Status: ACTIVE | COMMUNITY